# Patient Record
Sex: MALE | Race: OTHER | HISPANIC OR LATINO | URBAN - METROPOLITAN AREA
[De-identification: names, ages, dates, MRNs, and addresses within clinical notes are randomized per-mention and may not be internally consistent; named-entity substitution may affect disease eponyms.]

---

## 2022-01-14 ENCOUNTER — APPOINTMENT (RX ONLY)
Dept: URBAN - METROPOLITAN AREA CLINIC 15 | Facility: CLINIC | Age: 32
Setting detail: DERMATOLOGY
End: 2022-01-14

## 2022-01-14 VITALS — HEIGHT: 69 IN | WEIGHT: 170 LBS

## 2022-01-14 DIAGNOSIS — Z41.9 ENCOUNTER FOR PROCEDURE FOR PURPOSES OTHER THAN REMEDYING HEALTH STATE, UNSPECIFIED: ICD-10-CM

## 2022-01-14 PROCEDURE — ? COSMETIC CONSULTATION: FILLERS

## 2022-01-14 PROCEDURE — ? COSMETIC CONSULTATION: QWO

## 2022-01-14 ASSESSMENT — LOCATION ZONE DERM: LOCATION ZONE: TRUNK

## 2022-01-14 ASSESSMENT — LOCATION DETAILED DESCRIPTION DERM
LOCATION DETAILED: LEFT BUTTOCK
LOCATION DETAILED: RIGHT BUTTOCK

## 2022-01-14 ASSESSMENT — LOCATION SIMPLE DESCRIPTION DERM
LOCATION SIMPLE: RIGHT BUTTOCK
LOCATION SIMPLE: LEFT BUTTOCK

## 2022-01-14 NOTE — PROCEDURE: MIPS QUALITY
Additional Notes: No medication reported.
Detail Level: Detailed
Quality 130: Documentation Of Current Medications In The Medical Record: Documentation of a medical reason(s) for not documenting, updating, or reviewing the patientâs current medications list (e.g., patient is in an urgent or emergent medical situation)

## 2022-01-14 NOTE — HPI: COSMETIC CONSULTATION
Additional History: Patient states he would like [facility] for [facility] buttocks and under eye filler. Kylie Flores has no previous history of treatments done. Patient states he has used under eye creams with no improvement.

## 2023-02-16 ENCOUNTER — APPOINTMENT (RX ONLY)
Dept: URBAN - METROPOLITAN AREA CLINIC 15 | Facility: CLINIC | Age: 33
Setting detail: DERMATOLOGY
End: 2023-02-16

## 2023-02-16 VITALS — WEIGHT: 160 LBS | HEIGHT: 70 IN

## 2023-02-16 DIAGNOSIS — L21.8 OTHER SEBORRHEIC DERMATITIS: ICD-10-CM

## 2023-02-16 DIAGNOSIS — E70.29 OTHER DISORDERS OF TYROSINE METABOLISM: ICD-10-CM

## 2023-02-16 DIAGNOSIS — L91.0 HYPERTROPHIC SCAR: ICD-10-CM

## 2023-02-16 PROCEDURE — 99213 OFFICE O/P EST LOW 20 MIN: CPT | Mod: 25

## 2023-02-16 PROCEDURE — ? INTRALESIONAL KENALOG

## 2023-02-16 PROCEDURE — ? PRESCRIPTION

## 2023-02-16 PROCEDURE — 11900 INJECT SKIN LESIONS </W 7: CPT

## 2023-02-16 PROCEDURE — ? PRESCRIPTION MEDICATION MANAGEMENT

## 2023-02-16 PROCEDURE — ? COUNSELING

## 2023-02-16 RX ORDER — KETOCONAZOLE 20 MG/ML
1 SHAMPOO, SUSPENSION TOPICAL
Qty: 120 | Refills: 2 | Status: ERX | COMMUNITY
Start: 2023-02-16

## 2023-02-16 RX ADMIN — KETOCONAZOLE 1: 20 SHAMPOO, SUSPENSION TOPICAL at 00:00

## 2023-02-16 ASSESSMENT — LOCATION DETAILED DESCRIPTION DERM
LOCATION DETAILED: RIGHT SUPERIOR ANTERIOR NECK
LOCATION DETAILED: RIGHT SUPERIOR CENTRAL MALAR CHEEK
LOCATION DETAILED: RIGHT MEDIAL EYEBROW
LOCATION DETAILED: LEFT CENTRAL EYEBROW
LOCATION DETAILED: RIGHT CENTRAL EYEBROW
LOCATION DETAILED: LEFT SUPERIOR CENTRAL MALAR CHEEK

## 2023-02-16 ASSESSMENT — LOCATION SIMPLE DESCRIPTION DERM
LOCATION SIMPLE: RIGHT ANTERIOR NECK
LOCATION SIMPLE: RIGHT CHEEK
LOCATION SIMPLE: LEFT EYEBROW
LOCATION SIMPLE: LEFT CHEEK
LOCATION SIMPLE: RIGHT EYEBROW

## 2023-02-16 ASSESSMENT — LOCATION ZONE DERM
LOCATION ZONE: FACE
LOCATION ZONE: NECK

## 2023-02-16 NOTE — HPI: SCAR
How Severe Is Your Scar?: mild
Is This A New Presentation, Or A Follow-Up?: Scar
Additional History: Pt in office for evaluation of scar,patient would like injections

## 2023-02-16 NOTE — HPI: DRY SKIN
How Severe Is Your Dry Skin?: mild
Is This A New Presentation Or A Follow-Up?: Dry Skin
Additional History: Pt in office for evaluation of âflaky and dry skinâ. Patient tried pimecrulimus cream 1%  prescribed by dermatologist with no improvement.

## 2023-02-16 NOTE — PROCEDURE: MIPS QUALITY
Quality 130: Documentation Of Current Medications In The Medical Record: Documentation of a medical reason(s) for not documenting, updating, or reviewing the patientâs current medications list (e.g., patient is in an urgent or emergent medical situation)
Detail Level: Detailed
Additional Notes: No medications reported.

## 2023-02-16 NOTE — PROCEDURE: INTRALESIONAL KENALOG
Ndc# For Kenalog Only: [id number]
Medical Necessity Clause: This procedure was medically necessary because the lesions that were treated were:
Validate Note Data When Using Inventory: Yes
Concentration Of Solution Injected (Mg/Ml): 2.5
X Size Of Lesion In Cm (Optional): 0
Include Z78.9 (Other Specified Conditions Influencing Health Status) As An Associated Diagnosis?: No
Detail Level: Detailed
Lot # (Optional): [id number]
Administered By (Optional): COLIN Jiang
Consent: The risks of atrophy were reviewed with the patient.
Kenalog Preparation: Kenalog
Total Volume Injected (Ccs- Only Use Numbers And Decimals): 0.2
Treatment Number (Optional): 1
Show Inventory Tab: Hide
Expiration Date (Optional): APR 2023

## 2023-02-16 NOTE — PROCEDURE: PRESCRIPTION MEDICATION MANAGEMENT
Detail Level: Zone
Initiate Treatment: .\\n\\n-ketoconazole 2 % shampoo- Leave shampoo on 5-10 minutes on the eyebrows area. Wash twice  per week when condition flares. *Patient has shampoo at home. *
Render In Strict Bullet Format?: No
Initiate Treatment: .\\n\\n- Recommended to continue using silicone sheets on the scar area. \\n- Advised to wear sunblock daily.

## 2023-03-16 ENCOUNTER — APPOINTMENT (RX ONLY)
Dept: URBAN - METROPOLITAN AREA CLINIC 15 | Facility: CLINIC | Age: 33
Setting detail: DERMATOLOGY
End: 2023-03-16

## 2023-03-16 VITALS — WEIGHT: 163 LBS | HEIGHT: 70 IN

## 2023-03-16 DIAGNOSIS — I78.8 OTHER DISEASES OF CAPILLARIES: ICD-10-CM

## 2023-03-16 DIAGNOSIS — L91.0 HYPERTROPHIC SCAR: ICD-10-CM

## 2023-03-16 PROCEDURE — ? TREATMENT REGIMEN

## 2023-03-16 PROCEDURE — ? COUNSELING

## 2023-03-16 PROCEDURE — ? INTRALESIONAL KENALOG

## 2023-03-16 PROCEDURE — ? PRESCRIPTION MEDICATION MANAGEMENT

## 2023-03-16 PROCEDURE — 11900 INJECT SKIN LESIONS </W 7: CPT

## 2023-03-16 PROCEDURE — 99212 OFFICE O/P EST SF 10 MIN: CPT | Mod: 25

## 2023-03-16 ASSESSMENT — LOCATION SIMPLE DESCRIPTION DERM
LOCATION SIMPLE: RIGHT ANTERIOR NECK
LOCATION SIMPLE: LEFT CHEEK
LOCATION SIMPLE: NOSE

## 2023-03-16 ASSESSMENT — LOCATION DETAILED DESCRIPTION DERM
LOCATION DETAILED: NASAL DORSUM
LOCATION DETAILED: RIGHT SUPERIOR ANTERIOR NECK
LOCATION DETAILED: LEFT MEDIAL MALAR CHEEK

## 2023-03-16 ASSESSMENT — LOCATION ZONE DERM
LOCATION ZONE: NECK
LOCATION ZONE: FACE
LOCATION ZONE: NOSE

## 2023-03-16 NOTE — PROCEDURE: INTRALESIONAL KENALOG
Ndc# For Kenalog Only: [id number]
Medical Necessity Clause: This procedure was medically necessary because the lesions that were treated were:
Validate Note Data When Using Inventory: Yes
Concentration Of Solution Injected (Mg/Ml): 2.5
X Size Of Lesion In Cm (Optional): 0
Include Z78.9 (Other Specified Conditions Influencing Health Status) As An Associated Diagnosis?: No
Detail Level: Detailed
Lot # (Optional): [id number]
Administered By (Optional): COLIN Barr
Consent: The risks of atrophy were reviewed with the patient.
Kenalog Preparation: Kenalog
Total Volume Injected (Ccs- Only Use Numbers And Decimals): 0.2
Treatment Number (Optional): 2
Show Inventory Tab: Hide
Expiration Date (Optional): APR 2024
22

## 2023-03-16 NOTE — PROCEDURE: TREATMENT REGIMEN
Plan: .\\n\\n- Recommended removal by electrodessication. \\n\\n- I counseled the patient regarding the following:\\n\\nCandela laser (nose):$200 each. \\n\\n3 treatments recommended every 4 weeks.
Detail Level: Zone

## 2023-03-16 NOTE — PROCEDURE: PRESCRIPTION MEDICATION MANAGEMENT
Continue Regimen: .\\n\\n- Recommended to continue using silicone sheets once nightly on the scar area. \\n- Advised to wear sunblock daily.
Detail Level: Zone
Render In Strict Bullet Format?: No

## 2023-08-02 ENCOUNTER — APPOINTMENT (RX ONLY)
Dept: URBAN - METROPOLITAN AREA CLINIC 15 | Facility: CLINIC | Age: 33
Setting detail: DERMATOLOGY
End: 2023-08-02

## 2023-08-02 DIAGNOSIS — L91.0 HYPERTROPHIC SCAR: ICD-10-CM

## 2023-08-02 DIAGNOSIS — Z41.9 ENCOUNTER FOR PROCEDURE FOR PURPOSES OTHER THAN REMEDYING HEALTH STATE, UNSPECIFIED: ICD-10-CM

## 2023-08-02 PROCEDURE — ? LASER VASCULAR LESION (COSMETIC)

## 2023-08-02 PROCEDURE — ? INTRALESIONAL KENALOG

## 2023-08-02 PROCEDURE — ? PRESCRIPTION MEDICATION MANAGEMENT

## 2023-08-02 PROCEDURE — 11900 INJECT SKIN LESIONS </W 7: CPT

## 2023-08-02 PROCEDURE — ? COUNSELING

## 2023-08-02 PROCEDURE — ? ADDITIONAL NOTES

## 2023-08-02 ASSESSMENT — LOCATION ZONE DERM
LOCATION ZONE: NOSE
LOCATION ZONE: LEG
LOCATION ZONE: NECK

## 2023-08-02 ASSESSMENT — LOCATION SIMPLE DESCRIPTION DERM
LOCATION SIMPLE: NOSE
LOCATION SIMPLE: LEFT THIGH
LOCATION SIMPLE: RIGHT ANTERIOR NECK

## 2023-08-02 ASSESSMENT — LOCATION DETAILED DESCRIPTION DERM
LOCATION DETAILED: LEFT ANTERIOR MEDIAL DISTAL THIGH
LOCATION DETAILED: NASAL DORSUM
LOCATION DETAILED: RIGHT SUPERIOR ANTERIOR NECK

## 2023-08-02 NOTE — PROCEDURE: ADDITIONAL NOTES
Render Risk Assessment In Note?: no
Detail Level: Simple
Additional Notes: .\\n\\n- Charged for Idalia yag laser (nose- Tx#1) $200.

## 2023-08-02 NOTE — PROCEDURE: INTRALESIONAL KENALOG
Ndc# For Kenalog Only: [id number]
Medical Necessity Clause: This procedure was medically necessary because the lesions that were treated were:
Validate Note Data When Using Inventory: Yes
Concentration Of Solution Injected (Mg/Ml): 5.0
X Size Of Lesion In Cm (Optional): 0
Include Z78.9 (Other Specified Conditions Influencing Health Status) As An Associated Diagnosis?: No
Detail Level: Detailed
Lot # (Optional): [id number]
Administered By (Optional): COLIN Thibodeaux
Consent: The risks of atrophy were reviewed with the patient.
Kenalog Preparation: Kenalog
Total Volume Injected (Ccs- Only Use Numbers And Decimals): 0.1
Treatment Number (Optional): 3
Show Inventory Tab: Hide
Expiration Date (Optional): SEP 2024
Kenalog Type Of Vial: Multiple Dose

## 2023-08-02 NOTE — PROCEDURE: LASER VASCULAR LESION (COSMETIC)
Spot Size: 3 mm
Fluence: 8
Cryogen Time (Ms): 78[address] [address]
Delay Time (Ms): 0717 [facility]
Consent: Written consent obtained, risks reviewed including but not limited to crusting, scabbing, blistering, scarring, darker or lighter pigmentary change, incidental hair removal, bruising, and/or incomplete removal.
Pulse Duration: 10 ms
Price (Use Numbers Only, No Special Characters Or $): 95339 [facility]
Delay Time (Ms): 20
Add Additional Setting ?: No
Cryogen Time (Ms): 30
Post-Care Instructions: I reviewed with the patient in detail post-care instructions. Patient should stay away from the sun and wear sun protection until treated areas are fully healed.
Number Of Pulses: 40/30, 70 pulses, 1.0 Hz
Spot Size: 7 mm
Detail Level: Zone
Fluence Override: 150 J/cm^2
Post Procedure Text: Vaseline and ice applied. Post care reviewed with patient.
Laser Type: Nd:YAG
External Cooling Fan Speed: 5
Delay Time (Ms): 6716 [facility]
Fluence Override: 221 [facility]
Cryogen Time (Ms): 60
Number Of Pulses: 15/20/10 ms, 62 pulses, 1.0 Hz
Other Poor Pregnancy Outcome/Referral for High Risk Care/Pregnancy resulting from Infertility treatment

## 2023-09-13 ENCOUNTER — APPOINTMENT (RX ONLY)
Dept: URBAN - METROPOLITAN AREA CLINIC 15 | Facility: CLINIC | Age: 33
Setting detail: DERMATOLOGY
End: 2023-09-13

## 2023-09-13 VITALS — WEIGHT: 166 LBS | HEIGHT: 69 IN

## 2023-09-13 VITALS — HEIGHT: 69 IN | WEIGHT: 160 LBS

## 2023-09-13 DIAGNOSIS — L81.8 OTHER SPECIFIED DISORDERS OF PIGMENTATION: ICD-10-CM

## 2023-09-13 DIAGNOSIS — L91.0 HYPERTROPHIC SCAR: ICD-10-CM

## 2023-09-13 DIAGNOSIS — Z41.9 ENCOUNTER FOR PROCEDURE FOR PURPOSES OTHER THAN REMEDYING HEALTH STATE, UNSPECIFIED: ICD-10-CM

## 2023-09-13 PROCEDURE — ? PRESCRIPTION MEDICATION MANAGEMENT

## 2023-09-13 PROCEDURE — ? SUNSCREEN RECOMMENDATIONS

## 2023-09-13 PROCEDURE — 11900 INJECT SKIN LESIONS </W 7: CPT

## 2023-09-13 PROCEDURE — ? BENIGN DESTRUCTION COSMETIC

## 2023-09-13 PROCEDURE — ? INTRALESIONAL KENALOG

## 2023-09-13 PROCEDURE — 99212 OFFICE O/P EST SF 10 MIN: CPT | Mod: 25

## 2023-09-13 PROCEDURE — ? COUNSELING

## 2023-09-13 PROCEDURE — ? PRESCRIPTION

## 2023-09-13 PROCEDURE — ? RECOMMENDATIONS

## 2023-09-13 RX ORDER — FLURANDRENOLIDE 4 UG/CM2
1 TAPE TOPICAL QHS
Qty: 1 | Refills: 0 | Status: ERX | COMMUNITY
Start: 2023-09-13

## 2023-09-13 RX ADMIN — FLURANDRENOLIDE 1: 4 TAPE TOPICAL at 00:00

## 2023-09-13 ASSESSMENT — LOCATION ZONE DERM
LOCATION ZONE: LEG
LOCATION ZONE: NECK
LOCATION ZONE: NOSE

## 2023-09-13 ASSESSMENT — LOCATION SIMPLE DESCRIPTION DERM
LOCATION SIMPLE: RIGHT ANTERIOR NECK
LOCATION SIMPLE: LEFT THIGH
LOCATION SIMPLE: NOSE

## 2023-09-13 ASSESSMENT — LOCATION DETAILED DESCRIPTION DERM
LOCATION DETAILED: NASAL ROOT
LOCATION DETAILED: RIGHT SUPERIOR ANTERIOR NECK
LOCATION DETAILED: LEFT ANTERIOR DISTAL THIGH

## 2023-09-13 NOTE — PROCEDURE: RECOMMENDATIONS
Detail Level: Zone
Render Risk Assessment In Note?: no
Recommendation Preamble: The following recommendations were made during the visit:
Recommendations (Free Text): .\\nAm\\nWash\\nApply vitamin C\\nApply sunscreen \\n\\n\\nPm\\nWash\\nApply Differin gel . Use twice a week.  Increase as tolerated \\nApply moisturizer

## 2023-09-13 NOTE — PROCEDURE: INTRALESIONAL KENALOG
Ndc# For Kenalog Only: [id number]
Medical Necessity Clause: This procedure was medically necessary because the lesions that were treated were:
Validate Note Data When Using Inventory: Yes
Concentration Of Solution Injected (Mg/Ml): 5.0
X Size Of Lesion In Cm (Optional): 0
Include Z78.9 (Other Specified Conditions Influencing Health Status) As An Associated Diagnosis?: No
Detail Level: Detailed
Lot # (Optional): [id number]
Administered By (Optional): COLIN Metcalf
Consent: The risks of atrophy were reviewed with the patient.
Kenalog Preparation: Kenalog
Total Volume Injected (Ccs- Only Use Numbers And Decimals): 0.1
Treatment Number (Optional): 4
Show Inventory Tab: Hide
Expiration Date (Optional): SEP 2024
Kenalog Type Of Vial: Multiple Dose

## 2023-10-12 ENCOUNTER — APPOINTMENT (RX ONLY)
Dept: URBAN - METROPOLITAN AREA CLINIC 15 | Facility: CLINIC | Age: 33
Setting detail: DERMATOLOGY
End: 2023-10-12

## 2023-10-12 VITALS — WEIGHT: 165 LBS | HEIGHT: 70 IN

## 2023-10-12 DIAGNOSIS — L81.8 OTHER SPECIFIED DISORDERS OF PIGMENTATION: ICD-10-CM

## 2023-10-12 DIAGNOSIS — L81.0 POSTINFLAMMATORY HYPERPIGMENTATION: ICD-10-CM

## 2023-10-12 DIAGNOSIS — L91.0 HYPERTROPHIC SCAR: ICD-10-CM | Status: IMPROVED

## 2023-10-12 PROCEDURE — ? COUNSELING

## 2023-10-12 PROCEDURE — ? PRESCRIPTION

## 2023-10-12 PROCEDURE — ? PRESCRIPTION MEDICATION MANAGEMENT

## 2023-10-12 PROCEDURE — ? ADDITIONAL NOTES

## 2023-10-12 PROCEDURE — 99213 OFFICE O/P EST LOW 20 MIN: CPT | Mod: 25

## 2023-10-12 PROCEDURE — 11900 INJECT SKIN LESIONS </W 7: CPT

## 2023-10-12 PROCEDURE — ? SUNSCREEN RECOMMENDATIONS

## 2023-10-12 PROCEDURE — ? INTRALESIONAL KENALOG

## 2023-10-12 RX ORDER — TACROLIMUS 1 MG/G
1 OINTMENT TOPICAL BID
Qty: 30 | Refills: 0 | Status: ERX | COMMUNITY
Start: 2023-10-12

## 2023-10-12 RX ADMIN — TACROLIMUS 1: 1 OINTMENT TOPICAL at 00:00

## 2023-10-12 ASSESSMENT — LOCATION DETAILED DESCRIPTION DERM
LOCATION DETAILED: LEFT ANTERIOR DISTAL THIGH
LOCATION DETAILED: RIGHT SUPERIOR ANTERIOR NECK

## 2023-10-12 ASSESSMENT — LOCATION SIMPLE DESCRIPTION DERM
LOCATION SIMPLE: LEFT THIGH
LOCATION SIMPLE: RIGHT ANTERIOR NECK

## 2023-10-12 ASSESSMENT — LOCATION ZONE DERM
LOCATION ZONE: LEG
LOCATION ZONE: NECK

## 2023-10-12 ASSESSMENT — BSA RASH: BSA RASH: 1

## 2023-10-12 NOTE — PROCEDURE: INTRALESIONAL KENALOG
Ndc# For Kenalog Only: [id number]
Medical Necessity Clause: This procedure was medically necessary because the lesions that were treated were:
Validate Note Data When Using Inventory: Yes
Concentration Of Solution Injected (Mg/Ml): 5.0
X Size Of Lesion In Cm (Optional): 0
Include Z78.9 (Other Specified Conditions Influencing Health Status) As An Associated Diagnosis?: No
Detail Level: Detailed
Lot # (Optional): [id number]
Administered By (Optional): COLIN Keith
Consent: The risks of atrophy were reviewed with the patient.
Kenalog Preparation: Kenalog
Total Volume Injected (Ccs- Only Use Numbers And Decimals): 0.2
Treatment Number (Optional): 5
Show Inventory Tab: Hide
Expiration Date (Optional): June 2024
Kenalog Type Of Vial: Multiple Dose

## 2023-10-12 NOTE — PROCEDURE: PRESCRIPTION MEDICATION MANAGEMENT
Continue Regimen: .\\n\\n- Recommended to continue using silicone sheets once nightly on the scar area. \\n- Advised to wear sunblock daily.
Detail Level: Zone
Render In Strict Bullet Format?: No
Initiate Treatment: .\\n-tacrolimus 0.1 % topical ointment Apply a small amount to the affected areas on left leg twice daily for one week, the stop for one week, then repeat.

## 2023-10-12 NOTE — PROCEDURE: ADDITIONAL NOTES
Additional Notes: .\\nPatient is aware that scar is improving but it is a slow process. He will like to see more significant improvement. Then, he was recommended to see Dr Zac Goel for further treatment \\n\\nDr. Alonzo Graff.  [place] [id number]\\[phone]
Detail Level: Simple
Render Risk Assessment In Note?: no